# Patient Record
Sex: MALE | Race: WHITE | Employment: FULL TIME | URBAN - METROPOLITAN AREA
[De-identification: names, ages, dates, MRNs, and addresses within clinical notes are randomized per-mention and may not be internally consistent; named-entity substitution may affect disease eponyms.]

---

## 2024-10-05 ENCOUNTER — APPOINTMENT (OUTPATIENT)
Dept: GENERAL RADIOLOGY | Age: 59
End: 2024-10-05
Payer: COMMERCIAL

## 2024-10-05 ENCOUNTER — HOSPITAL ENCOUNTER (EMERGENCY)
Age: 59
Discharge: HOME | End: 2024-10-05
Payer: COMMERCIAL

## 2024-10-05 ENCOUNTER — APPOINTMENT (OUTPATIENT)
Dept: ULTRASOUND IMAGING | Age: 59
End: 2024-10-05
Payer: COMMERCIAL

## 2024-10-05 VITALS
WEIGHT: 250 LBS | OXYGEN SATURATION: 98 % | RESPIRATION RATE: 20 BRPM | HEART RATE: 90 BPM | SYSTOLIC BLOOD PRESSURE: 147 MMHG | DIASTOLIC BLOOD PRESSURE: 93 MMHG | TEMPERATURE: 97 F

## 2024-10-05 DIAGNOSIS — M25.571 ACUTE RIGHT ANKLE PAIN: Primary | ICD-10-CM

## 2024-10-05 PROCEDURE — 93971 EXTREMITY STUDY: CPT

## 2024-10-05 PROCEDURE — 73610 X-RAY EXAM OF ANKLE: CPT

## 2024-10-05 PROCEDURE — 99211 OFF/OP EST MAY X REQ PHY/QHP: CPT

## 2024-10-05 RX ORDER — VENLAFAXINE HYDROCHLORIDE 37.5 MG/1
CAPSULE, EXTENDED RELEASE ORAL
COMMUNITY

## 2024-10-05 RX ORDER — LISINOPRIL 20 MG/1
TABLET ORAL
COMMUNITY

## 2024-10-05 RX ORDER — INSULIN GLARGINE 100 [IU]/ML
INJECTION, SOLUTION SUBCUTANEOUS
COMMUNITY

## 2024-10-05 RX ORDER — INSULIN ASPART INJECTION 100 [IU]/ML
INJECTION, SOLUTION SUBCUTANEOUS
COMMUNITY

## 2024-10-06 NOTE — ED PROVIDER NOTES
Department of Emergency Medicine   ED  Encounter Note  Admit Date/RoomTime: 10/5/2024  5:53 PM  ED Room: [facility]    NAME: Juventino Velazquez  : 1965  MRN: [id number]     Chief Complaint:  Rt ankle swelling ( No known injury)    History of Present Illness       Juventino Velazquez is a 58 y.o. old male who presents to urgent careby private vehicle, for non-traumatic Right ankle pain/swelling which occured several week(s) prior to arrival.   The complaint is due to no known cause.  His weight bearing status is normal.  Patient has no prior history of pain/injury with regards to today's visit.   Since onset the symptoms have been stable.  His pain is aggraveated by weight bearing and relieved by nothing, as no treatment has been provided prior to this visit. He denies any wounds, fever, chills, chest pain or shortness of breath.    ROS   Pertinent positives and negatives are stated within HPI, all other systems reviewed and are negative.    Past Medical History:  has no past medical history on file.    Surgical History:  has no past surgical history on file.    Social History:      Family History: family history is not on file.     Allergies: Patient has no known allergies.    Physical Exam   Oxygen Saturation Interpretation: Normal.        ED Triage Vitals [10/05/24 1755]   BP Systolic BP Percentile Diastolic BP Percentile Temp Temp src Pulse Respirations SpO2   (!) 147/93 -- -- 97 °F (36.1 °C) -- 90 20 98 %      Height Weight - Scale         -- 113.4 kg (250 lb)               Constitutional:  Alert, development consistent with age.  HEENT:  NC/NT.  Airway patent.  Neck:  Normal ROM.  Supple.  Physical Exam  Right Lower Extremity(s): ankle, lower leg              Tenderness:  mild.               Swelling: Mild.        Calf:  Negative Sriram's sign. No cords or calf tenderness. Calf/Ankle edema is present..             Deformity: no deformity observed/palpated.               ROM: full range of motion.

## 2024-10-17 ENCOUNTER — OFFICE VISIT (OUTPATIENT)
Dept: URGENT CARE | Age: 59
End: 2024-10-17
Payer: COMMERCIAL

## 2024-10-17 VITALS
TEMPERATURE: 98.6 F | HEART RATE: 96 BPM | DIASTOLIC BLOOD PRESSURE: 93 MMHG | OXYGEN SATURATION: 98 % | SYSTOLIC BLOOD PRESSURE: 147 MMHG | RESPIRATION RATE: 16 BRPM

## 2024-10-17 DIAGNOSIS — M25.471 RIGHT ANKLE SWELLING: ICD-10-CM

## 2024-10-17 DIAGNOSIS — R03.0 ELEVATED BLOOD PRESSURE READING WITHOUT DIAGNOSIS OF HYPERTENSION: ICD-10-CM

## 2024-10-17 DIAGNOSIS — M79.671 RIGHT FOOT PAIN: Primary | ICD-10-CM

## 2024-10-17 DIAGNOSIS — M79.89 SWELLING OF RIGHT FOOT: ICD-10-CM

## 2024-10-17 RX ORDER — CYCLOBENZAPRINE HCL 10 MG
10 TABLET ORAL NIGHTLY PRN
Qty: 30 TABLET | Refills: 0 | Status: SHIPPED | OUTPATIENT
Start: 2024-10-17 | End: 2024-12-16

## 2024-10-17 RX ORDER — METHYLPREDNISOLONE 4 MG/1
TABLET ORAL
Qty: 21 TABLET | Refills: 0 | Status: SHIPPED | OUTPATIENT
Start: 2024-10-17 | End: 2024-10-24

## 2024-10-17 ASSESSMENT — ENCOUNTER SYMPTOMS
MUSCLE WEAKNESS: 0
NUMBNESS: 0
LOSS OF MOTION: 0
LOSS OF SENSATION: 0
INABILITY TO BEAR WEIGHT: 0
TINGLING: 0

## 2024-10-18 NOTE — PROGRESS NOTES
Subjective   Patient ID: Gilbert Young is a 58 y.o. male. They present today with a chief complaint of Ankle Pain.    History of Present Illness  Patient reported right foot and ankle pain and swelling for 3 weeks.  Patient rated the pain as a 1 out of 10 at rest and 8 out of 10 with weightbearing.  Patient was evaluated in the ER and urgent care 2 weeks ago.  X-ray and ultrasound of the right foot and ankle were unremarkable.  Aleve did not improve the symptoms.  Patient denied injuries, trauma and falls.  Patient denied numbness and tingling.  Patient denies shortness of breath, wheezing and chest pain.      History provided by:  Patient   used: No    Ankle Pain   Incident onset: 3 weeks. There was no injury mechanism. The pain is at a severity of 8/10. The pain is severe. The pain has been Fluctuating since onset. Pertinent negatives include no inability to bear weight, loss of motion, loss of sensation, muscle weakness, numbness or tingling. He reports no foreign bodies present. The symptoms are aggravated by weight bearing. Treatments tried: Aleve. The treatment provided no relief.       Past Medical History  Allergies as of 10/17/2024    (No Known Allergies)       (Not in a hospital admission)       History reviewed. No pertinent past medical history.    History reviewed. No pertinent surgical history.         Review of Systems  Review of Systems   Neurological:  Negative for tingling and numbness.               Objective    Vitals:    10/17/24 1949   BP: (!) 147/93   Pulse: 96   Resp: 16   Temp: 37 °C (98.6 °F)   SpO2: 98%     No LMP for male patient.    Physical Exam  Vitals and nursing note reviewed.   Constitutional:       Appearance: Normal appearance.   HENT:      Head: Normocephalic and atraumatic.   Cardiovascular:      Rate and Rhythm: Normal rate and regular rhythm.   Pulmonary:      Effort: Pulmonary effort is normal.      Breath sounds: Normal breath sounds.   Feet:       Comments: Right foot and ankle with swelling and tenderness. There is tenderness over the medial malleolus. There is tenderness over the lateral malleolus. There is tenderness over the anterior ankle mortise. The dorsal foot is tender. +2 pitting edema noted on the right ankle. The skin is intact. Is neurovascularly intact distally. The remainder of the extremity is nontender, specifically, nontender over the knee and fibular head.  Neurological:      Mental Status: He is alert.         Procedures    Point of Care Test & Imaging Results from this visit  No results found for this visit on 10/17/24.   No results found.    Diagnostic study results (if any) were reviewed by TONY Garcia.    Assessment/Plan   Allergies, medications, history, and pertinent labs/EKGs/Imaging reviewed by TONY Garcia.     Medical Decision Making  Elevated elevated blood pressure readings.  Advised to avoid NSAID and pseudoephedrine and recheck blood pressure at home.  If your blood pressure remains equal to or greater than 140/90, to follow up with primary care provider. Call 911 or go to the nearest ER if you develop chest pain, weakness on one side, and visual change.    Patient was advised to check his blood sugar more frequently and adjust his insulin per his sliding scale.  Stop Aleve while taking Medrol Dosepak. Resume ibuprofen after finishing Medrol Dosepak.  Over  Take all medications as instructed.  Rest, elevation and compression discussed.  Take Tylenol as needed for aches and pain.  Pain should improve in 1-2 weeks.  Referred to podiatrist.  If symptoms do not improve, advised to return and/or follow-up with PCP.  Call 911 or go to the nearest ER if the symptoms worsen.  Patient verbalized understanding of these instructions and left in stable condition.      Orders and Diagnoses  Diagnoses and all orders for this visit:  Right foot pain  -     Referral to Podiatry; Future  -     methylPREDNISolone (Medrol  Dospak) 4 mg tablets; Take as directed on package.  -     cyclobenzaprine (Flexeril) 10 mg tablet; Take 1 tablet (10 mg) by mouth as needed at bedtime for muscle spasms.  Right ankle swelling  -     Referral to Podiatry; Future  -     methylPREDNISolone (Medrol Dospak) 4 mg tablets; Take as directed on package.  -     cyclobenzaprine (Flexeril) 10 mg tablet; Take 1 tablet (10 mg) by mouth as needed at bedtime for muscle spasms.  Swelling of right foot  -     Referral to Podiatry; Future  -     methylPREDNISolone (Medrol Dospak) 4 mg tablets; Take as directed on package.  -     cyclobenzaprine (Flexeril) 10 mg tablet; Take 1 tablet (10 mg) by mouth as needed at bedtime for muscle spasms.      Medical Admin Record      Patient disposition: Home    Electronically signed by TONY Garcia  8:20 PM

## 2024-10-18 NOTE — PATIENT INSTRUCTIONS
Elevated elevated blood pressure readings.  Recheck your blood pressure at home.  If your blood pressure remains equal to or greater than 140/90, to follow up with primary care provider. Call 911 or go to the nearest ER if you develop chest pain, weakness on one side, and visual change.    Patient was advised to check his blood sugar more frequently and adjust his insulin per his sliding scale.  Stop Aleve while taking Medrol Dosepak. Resume ibuprofen after finishing Medrol Dosepack.  Take all medications as instructed.  Rest, elevation and compression discussed.  Take Tylenol as needed for aches and pain.  Pain should improve in 1-2 weeks.  Referred to podiatrist.  If symptoms do not improve, advised to return and/or follow-up with PCP.  Call 911 or go to the nearest ER if the symptoms worsen.